# Patient Record
Sex: MALE | Race: OTHER | Employment: FULL TIME | URBAN - METROPOLITAN AREA
[De-identification: names, ages, dates, MRNs, and addresses within clinical notes are randomized per-mention and may not be internally consistent; named-entity substitution may affect disease eponyms.]

---

## 2020-04-25 ENCOUNTER — HOSPITAL ENCOUNTER (EMERGENCY)
Age: 25
Discharge: HOME | End: 2020-04-25
Attending: EMERGENCY MEDICINE
Payer: SELF-PAY

## 2020-04-25 ENCOUNTER — APPOINTMENT (OUTPATIENT)
Dept: GENERAL RADIOLOGY | Age: 25
End: 2020-04-25
Attending: EMERGENCY MEDICINE
Payer: SELF-PAY

## 2020-04-25 VITALS
DIASTOLIC BLOOD PRESSURE: 88 MMHG | SYSTOLIC BLOOD PRESSURE: 128 MMHG | HEART RATE: 102 BPM | HEIGHT: 65 IN | WEIGHT: 135 LBS | BODY MASS INDEX: 22.49 KG/M2 | OXYGEN SATURATION: 94 % | TEMPERATURE: 99.1 F | RESPIRATION RATE: 18 BRPM

## 2020-04-25 DIAGNOSIS — R19.7 DIARRHEA, UNSPECIFIED TYPE: ICD-10-CM

## 2020-04-25 DIAGNOSIS — S81.012A KNEE LACERATION, LEFT, INITIAL ENCOUNTER: Primary | ICD-10-CM

## 2020-04-25 PROCEDURE — 90715 TDAP VACCINE 7 YRS/> IM: CPT | Performed by: EMERGENCY MEDICINE

## 2020-04-25 PROCEDURE — 73562 X-RAY EXAM OF KNEE 3: CPT

## 2020-04-25 PROCEDURE — 74011000250 HC RX REV CODE- 250: Performed by: EMERGENCY MEDICINE

## 2020-04-25 PROCEDURE — 74011250636 HC RX REV CODE- 250/636: Performed by: EMERGENCY MEDICINE

## 2020-04-25 PROCEDURE — 74011250637 HC RX REV CODE- 250/637: Performed by: EMERGENCY MEDICINE

## 2020-04-25 PROCEDURE — 75810000295 HC COMPLEX WND RPR

## 2020-04-25 PROCEDURE — 90471 IMMUNIZATION ADMIN: CPT

## 2020-04-25 PROCEDURE — 99283 EMERGENCY DEPT VISIT LOW MDM: CPT

## 2020-04-25 PROCEDURE — 96374 THER/PROPH/DIAG INJ IV PUSH: CPT

## 2020-04-25 RX ORDER — DIPHENOXYLATE HYDROCHLORIDE AND ATROPINE SULFATE 2.5; .025 MG/1; MG/1
2 TABLET ORAL
Status: DISCONTINUED | OUTPATIENT
Start: 2020-04-25 | End: 2020-04-26 | Stop reason: HOSPADM

## 2020-04-25 RX ORDER — HYOSCYAMINE SULFATE 0.12 MG/1
0.25 TABLET SUBLINGUAL
Qty: 20 TAB | Refills: 0 | Status: SHIPPED | OUTPATIENT
Start: 2020-04-25

## 2020-04-25 RX ORDER — CEFAZOLIN SODIUM/WATER 2 G/20 ML
2 SYRINGE (ML) INTRAVENOUS
Status: COMPLETED | OUTPATIENT
Start: 2020-04-25 | End: 2020-04-25

## 2020-04-25 RX ORDER — DIPHENOXYLATE HYDROCHLORIDE AND ATROPINE SULFATE 2.5; .025 MG/1; MG/1
2 TABLET ORAL
Qty: 20 TAB | Refills: 0 | Status: SHIPPED | OUTPATIENT
Start: 2020-04-25

## 2020-04-25 RX ORDER — HYOSCYAMINE SULFATE 0.12 MG/1
0.25 TABLET SUBLINGUAL
Status: DISCONTINUED | OUTPATIENT
Start: 2020-04-25 | End: 2020-04-26 | Stop reason: HOSPADM

## 2020-04-25 RX ADMIN — DIPHENOXYLATE HYDROCHLORIDE AND ATROPINE SULFATE 2 TABLET: 2.5; .025 TABLET ORAL at 21:43

## 2020-04-25 RX ADMIN — TETANUS TOXOID, REDUCED DIPHTHERIA TOXOID AND ACELLULAR PERTUSSIS VACCINE, ADSORBED 0.5 ML: 5; 2.5; 8; 8; 2.5 SUSPENSION INTRAMUSCULAR at 19:58

## 2020-04-25 RX ADMIN — WATER 2 G: 1000 INJECTION, SOLUTION INTRAVENOUS at 19:59

## 2020-04-25 RX ADMIN — HYOSCYAMINE SULFATE 0.25 MG: 0.12 TABLET ORAL; SUBLINGUAL at 21:43

## 2020-04-25 NOTE — ED TRIAGE NOTES
Pt moved into triage in wheelchair wearing a mask. Pt is Vietnamese-speaking.  line used. Pt c/o laceration to left knee from chainsaw. Pt reports he was working while using a chainsaw when he tripped. Pt reports the chainsaw slipped out of his hand and cut his leg. Pt reports this occurred approximately 15 minutes ago. Pt has laceration approximately 3 inches long across left knee cap. Bleeding controlled. Pt has + PMS in left foot. Pt denies any medical Hx, medications, or Alg. Pt reports he has not had a tetanus shot recently.

## 2020-04-26 NOTE — ED NOTES
I have reviewed discharge instructions with the patient. The patient verbalized understanding. Patient left ED via Discharge Method: wheelchair to Home with friends  Opportunity for questions and clarification provided. Patient given 2 scripts. To continue your aftercare when you leave the hospital, you may receive an automated call from our care team to check in on how you are doing. This is a free service and part of our promise to provide the best care and service to meet your aftercare needs.  If you have questions, or wish to unsubscribe from this service please call [phone]. Thank you for Choosing our [address] [place] [address] Emergency Department.

## 2020-04-26 NOTE — PROGRESS NOTES
present via phone for registration.       Thank you,          [facility]  leopoldo [address] [place] [address]  [phone] (phone)

## 2020-04-26 NOTE — ED PROVIDER NOTES
78-year-old gentleman presents to the ER after sustaining a laceration to his left distal thigh just above the knee. Injury was sustained from a chainsaw which had dropped from his graft. Occurred just prior to arrival.  Patient unsure when his last tetanus shot was. He disavow's to the foot or toes. No past medical history on file. No past surgical history on file. No family history on file. Social History     Socioeconomic History    Marital status: SINGLE     Spouse name: Not on file    Number of children: Not on file    Years of education: Not on file    Highest education level: Not on file   Occupational History    Not on file   Social Needs    Financial resource strain: Not on file    Food insecurity     Worry: Not on file     Inability: Not on file    Transportation needs     Medical: Not on file     Non-medical: Not on file   Tobacco Use    Smoking status: Not on file   Substance and Sexual Activity    Alcohol use: Not on file    Drug use: Not on file    Sexual activity: Not on file   Lifestyle    Physical activity     Days per week: Not on file     Minutes per session: Not on file    Stress: Not on file   Relationships    Social connections     Talks on phone: Not on file     Gets together: Not on file     Attends Alevism service: Not on file     Active member of club or organization: Not on file     Attends meetings of clubs or organizations: Not on file     Relationship status: Not on file    Intimate partner violence     Fear of current or ex partner: Not on file     Emotionally abused: Not on file     Physically abused: Not on file     Forced sexual activity: Not on file   Other Topics Concern    Not on file   Social History Narrative    Not on file         ALLERGIES: Patient has no known allergies. Review of Systems   Musculoskeletal: Negative for arthralgias and gait problem. Skin: Positive for wound. Negative for color change.    Neurological: Negative for weakness and numbness. Vitals:    04/25/20 1845   BP: 128/88   Pulse: (!) 102   Resp: 18   Temp: 99.1 °F (37.3 °C)   SpO2: 94%   Weight: 61.2 kg (135 lb)   Height: 5' 4.96\" (1.65 m)            Physical Exam  Vitals signs and nursing note reviewed. Constitutional:       General: He is not in acute distress. Appearance: Normal appearance. He is well-developed. He is not ill-appearing, toxic-appearing or diaphoretic. HENT:      Head: Normocephalic and atraumatic. Eyes:      General:         Right eye: No discharge. Left eye: No discharge. Conjunctiva/sclera: Conjunctivae normal.   Neck:      Musculoskeletal: Normal range of motion and neck supple. Pulmonary:      Effort: Pulmonary effort is normal. No respiratory distress. Musculoskeletal: Normal range of motion. Legs:    Skin:     General: Skin is warm and dry. Findings: No rash. Neurological:      General: No focal deficit present. Mental Status: He is alert and oriented to person, place, and time. Mental status is at baseline. Motor: No abnormal muscle tone. Comments: cni 2-12 grossly  Nl gait,  Nl speech     Psychiatric:         Mood and Affect: Mood normal.         Behavior: Behavior normal.          MDM  Number of Diagnoses or Management Options  Diarrhea, unspecified type:   Knee laceration, left, initial encounter:   Diagnosis management comments: Medical decision making note:  5 cm complex laceration to left thigh from chainsaw. Closed nicely. 1 dose prophylactic antibiotics IV, irrigated thoroughly. The stitches in 2 weeks as this is close enough to the knee I worry about dehiscence  This concludes the \"medical decision making note\" part of this emergency department visit note.            Wound Repair  Performed by: attendingPreparation: skin prepped with Betadine  Location details: left leg  Wound length:2.6 - 7.5 cm  Anesthesia: local infiltration    Anesthesia:  Local Anesthetic: lidocaine 1% without epinephrine  Anesthetic total: 15 mL  Irrigation solution: saline (400cc)  Irrigation method: syringe  Debridement: none  Skin closure: 4-0 nylon  Number of sutures: 13 (The long running baseball stitch counts as 1)  Technique: running and complex  Approximation: close  Dressing: antibiotic ointment and non-adhesive packing strip  Patient tolerance: Patient tolerated the procedure well with no immediate complications  My total time at bedside, performing this procedure was 16-30 minutes.

## 2020-04-26 NOTE — PROGRESS NOTES
present via phone for an encounter with Devi Langston RN for discharge instructions.        Thank you,          [facility]  [facility]  [phone] (phone)

## 2020-04-26 NOTE — DISCHARGE INSTRUCTIONS
Leave stitches in for 14 days  Juanpablo Perez con Dr Luis Ayala, en 14 alexander (dos semanas) para quitarse las puntas    Use antibiotic ointment for next 3 days  Debe usar leonor crema de antibiotico, por yue alexander    Its ok to get wet briefly with running water, just dont swim or hold under water for baths  esta davida para ser mojado por laura tiempo sofi, no debe nadar, o pone la rodilla abajo agua por much tiempo cuando banarse    Use levsin as needed for stomach cramps  [place] las pastillas de [place] ([address]) por calambres en el estomago (se pongo abajo de la linga)      Use lomotil as needed for diarrhea  ruma las pastillas por diarrhea    Take a pepcid AC, or prilosec OTC, daily for stomach acid  Debe tratar leonor pastilla de pepcid a.c., O, leonor pastilla de prilosec otc, cada kennedy por acido en el estomago    Patient Education        Crow: Instrucciones de cuidado  Cuts: Care Instructions  Instrucciones de cuidado  Un grazyna puede ocurrir en cualquier parte del cuerpo. A veces, se usan puntos de sutura, grapas, [facility] para la piel o cintas [address] llamadas Steri-Strips para mantener juntos los bordes de un grazyna y ayudar a que sane. Las cintas Steri-Strip pueden usarse por sí solas o junto con puntos de sutura o grapas. Otras veces, se adelaide los crow abiertos. Si el grazyna es profundo y [employer], es posible que el médico haya cerrado el grazyna en dos capas. Leonor capa más profunda de puntos de sutura junta la parte profunda del grazyna. Estos puntos se disuelven y no es necesario extraerlos. El cierre de la capa superior, que puede [place] por medio de Julian henley Steri-Strips o [facility], es lo que se puede tye sobre el grazyna. Con frecuencia, un grazyna se cubre con leonor venda. El médico lo ha examinado minuciosamente, carla pueden presentarse problemas más tarde. Si nota algún problema o nuevos síntomas, busque tratamiento médico de inmediato.   [place] de seguimiento es leonor parte clave de hatch tratamiento y seguridad. Asegúrese de hacer y acudir a todas las citas, y llame a hatch médico si está teniendo problemas. También es reginaldo buena idea saber los resultados de juan exámenes y mantener reginaldo lista de los medicamentos que ruma. ¿Cómo puede cuidarse en el hogar? Si un grazyna está abierto o cerrado  · Apoye la aryan afectada sobre reginaldo almohada en cualquier momento que esté sentado o acostado esteban los 3 días siguientes. Trate de mantenerla por encima del nivel del corazón. [place] ayudará a reducir la hinchazón. · Mantenga la herida seca esteban las primeras 24 a 48 horas. Después de [address] [employer], puede ducharse si el médico lo [place]. Seque el grazyna con toques suaves de toalla. · No remoje el grazyna, gisselle en reginaldo brian (bañera). Hatch médico le indicará cuándo es seguro mojar el grazyna. · Después de las primeras 24 a 48 horas, limpie el grazyna con agua y jabón 2 veces al día, a menos que el médico le dé indicaciones diferentes. ? No use peróxido de hidrógeno (agua [place]) ni alcohol, los cuales pueden retrasar la sanación. ? Puede cubrir el grazyna con reginaldo capa delgada de vaselina y reginaldo venda antiadherente. ? Si el médico colocó reginaldo venda sobre el grazyna, colóquese reginaldo venda nueva después de limpiar el grazyna o si la venda se moja o se ensucia. · Evite cualquier actividad que pudiera hacer que el grazyna se cirilo de nuevo. · Sea sreedhar con los medicamentos. Danette y siga todas las indicaciones de la [place]. ? Si el médico le recetó un analgésico (medicamento para el dolor), tómelo según las indicaciones. ? Si no está tomando un analgésico recetado, pregúntele a hatch médico si puede eleazar laura de [employer]. Si se cerró el grazyna con puntos, grapas o Steri-Strips  · Siga las instrucciones anteriores para los crow abiertos o cerrados. · No se quite los puntos o las grapas por sí mismo. El médico le indicará cuándo debe volver para que le quiten los [address] [facility] [address] grapas.   · [address] [facility] [employer] se desprendan por sí solas. Si se cerró el grazyna con un adhesivo para la piel  · Siga las instrucciones anteriores para los crow abiertos o cerrados. · Déjese puesto el [employer] sobre la piel hasta que se desprenda por sí solo. [place] puede tardar entre 5 y 7 días. · No se rasque, frote ni hurgue el [employer]. · No se aplique la parte pegajosa de reginaldo venda directamente sobre el [employer]. · No se aplique ningún tipo de pomada, crema o loción sobre la aryan. [place] puede hacer que el [employer] se desprenda demasiado pronto. No use peróxido de hidrógeno (agua [place]) ni alcohol, los cuales pueden retrasar la sanación. ¿Cuándo debes pedir ayuda? Llama a tu médico ahora mismo o busca atención médica inmediata si:    · Tienes dolor nuevo, o el dolor empeora.     · La piel cerca del grazyna está fría o pálida, o cambia de color.     · Sientes hormigueo, debilitamiento o entumecimiento cerca del grazyna.     · El grazyna comienza a sangrar, y la tonie empapa la venda. Es normal que salga reginaldo pequeña cantidad de tonie.     · Tienes dificultades para  la aryan cercana al grazyna.     · Tienes síntomas de infección, tales gisselle:  ? Aumento del dolor, la hinchazón, el enrojecimiento o la temperatura alrededor del grazyna. ? Vetas rojizas que comienzan en el grazyna. ? Pus que sale del grazyna. ? Orpha Muriel especial atención a los cambios en tu nadine y asegúrate de comunicarte con tu médico si:    · El grazyna vuelve a abrirse.     · No mejoras gisselle se esperaba. ¿Dónde puede encontrar más información en inglés? Vaya a http://el-[email or web address].info/  Chris M735 en la búsqueda para aprender más acerca de \"Crow: Instrucciones de cuidado. \"  Revisado: 26 junio, 2019Versión del contenido: 12.4  © 3075-4489 [employer], [employer]. Las instrucciones de cuidado fueron adaptadas bajo licencia por [employer] (which disclaims liability or warranty for this information).  Si usted tiene Dmitry's reginaldo afección médica o sobre estas instrucciones, siempre pregunte a hatch profesional de nadine. [facility] niega toda garantía o responsabilidad por hatch uso de esta información.